# Patient Record
Sex: MALE | Race: BLACK OR AFRICAN AMERICAN | NOT HISPANIC OR LATINO | Employment: STUDENT | URBAN - NONMETROPOLITAN AREA
[De-identification: names, ages, dates, MRNs, and addresses within clinical notes are randomized per-mention and may not be internally consistent; named-entity substitution may affect disease eponyms.]

---

## 2023-11-08 ENCOUNTER — OFFICE VISIT (OUTPATIENT)
Dept: FAMILY MEDICINE CLINIC | Facility: CLINIC | Age: 23
End: 2023-11-08

## 2023-11-08 VITALS
TEMPERATURE: 98.2 F | BODY MASS INDEX: 27.35 KG/M2 | OXYGEN SATURATION: 98 % | HEIGHT: 75 IN | HEART RATE: 71 BPM | WEIGHT: 220 LBS

## 2023-11-08 DIAGNOSIS — J01.00 ACUTE NON-RECURRENT MAXILLARY SINUSITIS: Primary | ICD-10-CM

## 2023-11-08 PROBLEM — F95.0 TRANSIENT TIC DISORDER: Status: ACTIVE | Noted: 2020-03-19

## 2023-11-08 PROBLEM — F98.8 ADD (ATTENTION DEFICIT DISORDER): Status: ACTIVE | Noted: 2020-08-10

## 2023-11-08 PROBLEM — U07.1 COVID-19 VIRUS DETECTED: Status: ACTIVE | Noted: 2020-08-31

## 2023-11-08 PROBLEM — E53.8 B12 DEFICIENCY: Status: ACTIVE | Noted: 2021-04-27

## 2023-11-08 PROBLEM — F12.90 MARIJUANA USE: Status: ACTIVE | Noted: 2020-08-31

## 2023-11-08 PROBLEM — F12.20 CANNABIS USE DISORDER, SEVERE, DEPENDENCE: Status: ACTIVE | Noted: 2021-04-27

## 2023-11-08 PROBLEM — F91.1 UNDERSOCIALIZED CONDUCT DISORDER, AGGRESSIVE TYPE: Status: ACTIVE | Noted: 2020-08-10

## 2023-11-08 PROBLEM — F31.13 BIPOLAR DISORDER WITH SEVERE MANIA: Status: ACTIVE | Noted: 2020-08-31

## 2023-11-08 PROBLEM — F25.0 SCHIZOAFFECTIVE DISORDER, BIPOLAR TYPE: Status: ACTIVE | Noted: 2021-04-27

## 2023-11-08 RX ORDER — HYDROXYZINE PAMOATE 50 MG/1
50 CAPSULE ORAL
COMMUNITY
Start: 2023-05-25

## 2023-11-08 RX ORDER — ARIPIPRAZOLE 400 MG
400 KIT INTRAMUSCULAR
COMMUNITY

## 2023-11-08 RX ORDER — AMOXICILLIN 875 MG/1
875 TABLET, COATED ORAL 2 TIMES DAILY
Qty: 14 TABLET | Refills: 0 | Status: SHIPPED | OUTPATIENT
Start: 2023-11-08 | End: 2023-11-15

## 2023-11-08 RX ORDER — CETIRIZINE HYDROCHLORIDE, PSEUDOEPHEDRINE HYDROCHLORIDE 5; 120 MG/1; MG/1
1 TABLET, FILM COATED, EXTENDED RELEASE ORAL 2 TIMES DAILY
Qty: 30 TABLET | Refills: 0 | Status: SHIPPED | OUTPATIENT
Start: 2023-11-08

## 2023-11-08 NOTE — PROGRESS NOTES
".Chief Complaint  URI    Subjective          History of Present Illness  Carrington Biggs is here today with URI sx    Patient states for almost a week he has been feeling ill.  He has had runny nose congestion as well as cough and wheezing.  He states that the cough has been keeping him up at night.  He states that he took some NyQuil a few days ago with help.  He states that he tried Maria A-Valatie over-the-counter as well.  He denies any sore throat or fever.  He states he did have a headache for a few days.  He has had some sinus pressure and pain as well as discolored drainage.  He states that he usually has a sinus infection about this time of year.  He states that he has no asthma history or inhaler use history.     Objective   Vital Signs:   Pulse 71   Temp 98.2 °F (36.8 °C)   Ht 190.5 cm (75\")   Wt 99.8 kg (220 lb)   SpO2 98%   BMI 27.50 kg/m²     Body mass index is 27.5 kg/m².      Review of Systems      Current Outpatient Medications:     ARIPiprazole ER (Abilify Maintena) 400 MG Suspension Reconstituted ER IM injection ER, Inject 400 mg into the appropriate muscle as directed by prescriber., Disp: , Rfl:     hydrOXYzine pamoate (VISTARIL) 50 MG capsule, Take 1 capsule by mouth., Disp: , Rfl:     amoxicillin (AMOXIL) 875 MG tablet, Take 1 tablet by mouth 2 (Two) Times a Day for 7 days., Disp: 14 tablet, Rfl: 0    cetirizine-pseudoephedrine (ZyrTEC-D) 5-120 MG per 12 hr tablet, Take 1 tablet by mouth 2 (Two) Times a Day., Disp: 30 tablet, Rfl: 0    Allergies: Lithium    Physical Exam  Vitals and nursing note reviewed.   Constitutional:       General: He is not in acute distress.     Appearance: Normal appearance. He is normal weight. He is not ill-appearing, toxic-appearing or diaphoretic.   HENT:      Head: Normocephalic and atraumatic.      Right Ear: Tympanic membrane, ear canal and external ear normal.      Left Ear: Tympanic membrane, ear canal and external ear normal.      Nose: Congestion and " rhinorrhea present.      Mouth/Throat:      Mouth: Mucous membranes are moist.      Pharynx: Posterior oropharyngeal erythema present.      Comments: Discolored postnasal drainage  Eyes:      Pupils: Pupils are equal, round, and reactive to light.   Cardiovascular:      Rate and Rhythm: Normal rate and regular rhythm.      Heart sounds: Normal heart sounds.   Pulmonary:      Effort: Pulmonary effort is normal.      Breath sounds: Normal breath sounds.   Neurological:      General: No focal deficit present.      Mental Status: He is alert.   Psychiatric:         Mood and Affect: Mood normal.        Result Review :                   Assessment and Plan    Diagnoses and all orders for this visit:    1. Acute non-recurrent maxillary sinusitis (Primary)  We will place patient on Amoxil for his infection as well as Zyrtec-D to help take care of some of the symptoms as well.  -     amoxicillin (AMOXIL) 875 MG tablet; Take 1 tablet by mouth 2 (Two) Times a Day for 7 days.  Dispense: 14 tablet; Refill: 0  -     cetirizine-pseudoephedrine (ZyrTEC-D) 5-120 MG per 12 hr tablet; Take 1 tablet by mouth 2 (Two) Times a Day.  Dispense: 30 tablet; Refill: 0        Follow Up   No follow-ups on file.  Patient was given instructions and counseling regarding his condition or for health maintenance advice. Please see specific information pulled into the AVS if appropriate.     HTERESE Smith  11/08/2023

## 2023-11-08 NOTE — LETTER
November 8, 2023     Patient: Carrington Biggs   YOB: 2000   Date of Visit: 11/8/2023       To Whom it May Concern:    Carrington Biggs was seen in my clinic on 11/8/2023. He is to be excused from practice today . He may return to school in one day.           Sincerely,          Myriam Yeboah PA-C        CC: No Recipients

## 2023-11-10 RX ORDER — TRAZODONE HYDROCHLORIDE 50 MG/1
50 TABLET ORAL NIGHTLY
COMMUNITY